# Patient Record
Sex: MALE | Race: WHITE | NOT HISPANIC OR LATINO | Employment: UNEMPLOYED | URBAN - METROPOLITAN AREA
[De-identification: names, ages, dates, MRNs, and addresses within clinical notes are randomized per-mention and may not be internally consistent; named-entity substitution may affect disease eponyms.]

---

## 2024-10-09 ENCOUNTER — HOSPITAL ENCOUNTER (EMERGENCY)
Facility: HOSPITAL | Age: 24
Discharge: LEFT BEFORE DISCHARGE | End: 2024-10-09
Attending: EMERGENCY MEDICINE

## 2024-10-09 VITALS
HEIGHT: 66 IN | BODY MASS INDEX: 28.61 KG/M2 | WEIGHT: 178 LBS | RESPIRATION RATE: 17 BRPM | OXYGEN SATURATION: 96 % | DIASTOLIC BLOOD PRESSURE: 87 MMHG | SYSTOLIC BLOOD PRESSURE: 138 MMHG | TEMPERATURE: 98 F | HEART RATE: 82 BPM

## 2024-10-09 DIAGNOSIS — M62.82 NON-TRAUMATIC RHABDOMYOLYSIS: ICD-10-CM

## 2024-10-09 DIAGNOSIS — R00.0 TACHYCARDIA: Primary | ICD-10-CM

## 2024-10-09 LAB
ALBUMIN SERPL BCP-MCNC: 4 G/DL (ref 3.5–5.2)
ALP SERPL-CCNC: 69 U/L (ref 55–135)
ALT SERPL W/O P-5'-P-CCNC: 30 U/L (ref 10–44)
ANION GAP SERPL CALC-SCNC: 12 MMOL/L (ref 8–16)
AST SERPL-CCNC: 41 U/L (ref 10–40)
BASOPHILS # BLD AUTO: 0.04 K/UL (ref 0–0.2)
BASOPHILS NFR BLD: 0.3 % (ref 0–1.9)
BILIRUB SERPL-MCNC: 0.3 MG/DL (ref 0.1–1)
BNP SERPL-MCNC: <10 PG/ML (ref 0–99)
BUN SERPL-MCNC: 19 MG/DL (ref 6–20)
CALCIUM SERPL-MCNC: 9.5 MG/DL (ref 8.7–10.5)
CHLORIDE SERPL-SCNC: 105 MMOL/L (ref 95–110)
CK SERPL-CCNC: 949 U/L (ref 20–200)
CO2 SERPL-SCNC: 24 MMOL/L (ref 23–29)
CREAT SERPL-MCNC: 1.2 MG/DL (ref 0.5–1.4)
DIFFERENTIAL METHOD BLD: ABNORMAL
EOSINOPHIL # BLD AUTO: 0.3 K/UL (ref 0–0.5)
EOSINOPHIL NFR BLD: 2.2 % (ref 0–8)
ERYTHROCYTE [DISTWIDTH] IN BLOOD BY AUTOMATED COUNT: 11.8 % (ref 11.5–14.5)
EST. GFR  (NO RACE VARIABLE): >60 ML/MIN/1.73 M^2
ETHANOL SERPL-MCNC: <10 MG/DL
GLUCOSE SERPL-MCNC: 109 MG/DL (ref 70–110)
HCT VFR BLD AUTO: 43.5 % (ref 40–54)
HCV AB SERPL QL IA: NEGATIVE
HGB BLD-MCNC: 14.9 G/DL (ref 14–18)
HIV 1+2 AB+HIV1 P24 AG SERPL QL IA: NEGATIVE
IMM GRANULOCYTES # BLD AUTO: 0.03 K/UL (ref 0–0.04)
IMM GRANULOCYTES NFR BLD AUTO: 0.3 % (ref 0–0.5)
LYMPHOCYTES # BLD AUTO: 1.7 K/UL (ref 1–4.8)
LYMPHOCYTES NFR BLD: 14.4 % (ref 18–48)
MCH RBC QN AUTO: 30.3 PG (ref 27–31)
MCHC RBC AUTO-ENTMCNC: 34.3 G/DL (ref 32–36)
MCV RBC AUTO: 88 FL (ref 82–98)
MONOCYTES # BLD AUTO: 0.9 K/UL (ref 0.3–1)
MONOCYTES NFR BLD: 7.3 % (ref 4–15)
NEUTROPHILS # BLD AUTO: 8.9 K/UL (ref 1.8–7.7)
NEUTROPHILS NFR BLD: 75.5 % (ref 38–73)
NRBC BLD-RTO: 0 /100 WBC
PLATELET # BLD AUTO: 313 K/UL (ref 150–450)
PMV BLD AUTO: 9 FL (ref 9.2–12.9)
POTASSIUM SERPL-SCNC: 3.7 MMOL/L (ref 3.5–5.1)
PROT SERPL-MCNC: 7.4 G/DL (ref 6–8.4)
RBC # BLD AUTO: 4.92 M/UL (ref 4.6–6.2)
SODIUM SERPL-SCNC: 141 MMOL/L (ref 136–145)
TROPONIN I SERPL DL<=0.01 NG/ML-MCNC: <0.006 NG/ML (ref 0–0.03)
WBC # BLD AUTO: 11.73 K/UL (ref 3.9–12.7)

## 2024-10-09 PROCEDURE — 84484 ASSAY OF TROPONIN QUANT: CPT | Performed by: EMERGENCY MEDICINE

## 2024-10-09 PROCEDURE — 85025 COMPLETE CBC W/AUTO DIFF WBC: CPT | Performed by: EMERGENCY MEDICINE

## 2024-10-09 PROCEDURE — 82077 ASSAY SPEC XCP UR&BREATH IA: CPT | Performed by: EMERGENCY MEDICINE

## 2024-10-09 PROCEDURE — 36415 COLL VENOUS BLD VENIPUNCTURE: CPT | Performed by: EMERGENCY MEDICINE

## 2024-10-09 PROCEDURE — 80053 COMPREHEN METABOLIC PANEL: CPT | Performed by: EMERGENCY MEDICINE

## 2024-10-09 PROCEDURE — 87389 HIV-1 AG W/HIV-1&-2 AB AG IA: CPT | Performed by: EMERGENCY MEDICINE

## 2024-10-09 PROCEDURE — 63600175 PHARM REV CODE 636 W HCPCS: Performed by: EMERGENCY MEDICINE

## 2024-10-09 PROCEDURE — 86803 HEPATITIS C AB TEST: CPT | Performed by: EMERGENCY MEDICINE

## 2024-10-09 PROCEDURE — 94760 N-INVAS EAR/PLS OXIMETRY 1: CPT

## 2024-10-09 PROCEDURE — 82550 ASSAY OF CK (CPK): CPT | Performed by: EMERGENCY MEDICINE

## 2024-10-09 PROCEDURE — 83880 ASSAY OF NATRIURETIC PEPTIDE: CPT | Performed by: EMERGENCY MEDICINE

## 2024-10-09 PROCEDURE — 99284 EMERGENCY DEPT VISIT MOD MDM: CPT

## 2024-10-09 RX ORDER — ONDANSETRON HYDROCHLORIDE 2 MG/ML
8 INJECTION, SOLUTION INTRAVENOUS
Status: DISCONTINUED | OUTPATIENT
Start: 2024-10-09 | End: 2024-10-10 | Stop reason: HOSPADM

## 2024-10-09 RX ORDER — PANTOPRAZOLE SODIUM 40 MG/10ML
40 INJECTION, POWDER, LYOPHILIZED, FOR SOLUTION INTRAVENOUS
Status: DISCONTINUED | OUTPATIENT
Start: 2024-10-09 | End: 2024-10-10 | Stop reason: HOSPADM

## 2024-10-09 RX ADMIN — SODIUM CHLORIDE, POTASSIUM CHLORIDE, SODIUM LACTATE AND CALCIUM CHLORIDE 1000 ML: 600; 310; 30; 20 INJECTION, SOLUTION INTRAVENOUS at 09:10

## 2024-10-09 NOTE — LETTER
Patient: Fortino Ng  YOB: 2000  Date: 10/9/2024 Time: 9:45 PM  Location: [facility]    Leaving the Hospital Against Medical Advice    Chart #:[id number]    This will certify that I, the undersigned,    ______________________________________________________________________    A patient in the above named medical center, having requested discharge and removal from the medical center against the advice of my attending physician(s), hereby release [facility], its physicians, officers and employees, severally and individually, from any and all liability of any nature whatsoever for any injury or harm or complication of any kind that may result directly or indirectly, by reason of my terminating my stay as a patient at [facility] and my departure from [facility], and hereby waive any and all rights of action I may now have or later acquire as a result of my voluntary departure from [facility] and the termination of my stay as a patient therein.    This release is made with the full knowledge of the danger that may result from the action which I am taking.      Date:_______________________                         ___________________________                                                                                    Patient/Legal Representative    Witness:        ____________________________                          ___________________________  Nurse                                                                        Physician     done
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
## 2024-10-13 ENCOUNTER — HOSPITAL ENCOUNTER (EMERGENCY)
Facility: HOSPITAL | Age: 24
Discharge: HOME | End: 2024-10-14
Attending: EMERGENCY MEDICINE

## 2024-10-13 DIAGNOSIS — F10.920 ALCOHOLIC INTOXICATION WITHOUT COMPLICATION: Primary | ICD-10-CM

## 2024-10-13 PROCEDURE — 99281 EMR DPT VST MAYX REQ PHY/QHP: CPT

## 2024-10-14 VITALS
BODY MASS INDEX: 26.52 KG/M2 | WEIGHT: 165 LBS | RESPIRATION RATE: 16 BRPM | DIASTOLIC BLOOD PRESSURE: 55 MMHG | HEIGHT: 66 IN | HEART RATE: 86 BPM | TEMPERATURE: 98 F | OXYGEN SATURATION: 98 % | SYSTOLIC BLOOD PRESSURE: 116 MMHG

## 2024-10-14 NOTE — ED PROVIDER NOTES
"Encounter Date: 10/13/2024       History     Chief Complaint   Patient presents with    Alcohol Intoxication     Walked to the hospital asking for help to stop drinking. Pt states he is homeless and steals the alcohol he drinks. Denies HI/SI.      24-year-old male with history of chronic alcohol abuse, presents to the ED with chief complaint requesting alcohol rehab.  He says he drinks heavily every day including today.  He was obviously intoxicated.  He says he wants to stop.  He has no other complaints.  He denies SI/HI.    The history is provided by the patient.     Review of patient's allergies indicates:   Allergen Reactions    Augmentin [amoxicillin-pot clavulanate]     Tramadol      No past medical history on file.  No past surgical history on file.  No family history on file.  Social History     Substance Use Topics    Alcohol use: Yes     Comment: Reportedly drinks daily, any alcohol he can "steal"     Review of Systems   Psychiatric/Behavioral:  Negative for suicidal ideas.    All other systems reviewed and are negative.      Physical Exam     Initial Vitals   BP Pulse Resp Temp SpO2   10/13/24 2222 10/13/24 2221 10/13/24 2221 10/13/24 2223 10/13/24 2221   (!) 145/63 99 16 98.2 °F (36.8 °C) 95 %      MAP       --                Physical Exam    Nursing note and vitals reviewed.  Constitutional: He appears well-developed and well-nourished. He is not diaphoretic. No distress.   HENT:   Head: Normocephalic and atraumatic.   Eyes: Conjunctivae are normal.   Neck: Neck supple.   Normal range of motion.  Cardiovascular:  Normal rate.           Pulmonary/Chest: No respiratory distress.   Abdominal: He exhibits no distension.   Musculoskeletal:         General: No edema.      Cervical back: Normal range of motion and neck supple.     Neurological:   Somnolent, arouses to voice, slurred speech, oriented to person and place   Skin: Skin is warm and dry. No rash noted. No erythema.         ED Course "   Procedures  Labs Reviewed - No data to display       Imaging Results    None          Medications - No data to display  Medical Decision Making  The patient is intoxicated.  There do not appear to be any other emergent issues or debilitating psychiatric issues.  Patient will be observed in the ED until he denzel and then re-evaluated, likely discharge with outpatient drug rehab resources.    Pt is now awake, appropriate, ambulating with a steady gait. Clinically sober, appropriate for discharge.                                       Clinical Impression:  Final diagnoses:  [F10.920] Alcoholic intoxication without complication (Primary)          ED Disposition Condition    Discharge Stable          ED Prescriptions    None       Follow-up Information       Follow up With Specialties Details Why Contact Info    Drug rehab of choice                 Nehemiah Winn MD  10/14/24 9020

## 2024-12-17 ENCOUNTER — HOSPITAL ENCOUNTER (EMERGENCY)
Facility: HOSPITAL | Age: 24
Discharge: HOME | End: 2024-12-17
Attending: EMERGENCY MEDICINE

## 2024-12-17 VITALS
DIASTOLIC BLOOD PRESSURE: 90 MMHG | TEMPERATURE: 98 F | WEIGHT: 165 LBS | BODY MASS INDEX: 26.52 KG/M2 | SYSTOLIC BLOOD PRESSURE: 136 MMHG | HEIGHT: 66 IN | RESPIRATION RATE: 16 BRPM | HEART RATE: 60 BPM | OXYGEN SATURATION: 100 %

## 2024-12-17 DIAGNOSIS — F15.10 METHAMPHETAMINE ABUSE: Primary | ICD-10-CM

## 2024-12-17 DIAGNOSIS — Z71.1 CONCERN ABOUT STD IN MALE WITHOUT DIAGNOSIS: ICD-10-CM

## 2024-12-17 PROCEDURE — 99283 EMERGENCY DEPT VISIT LOW MDM: CPT

## 2024-12-17 NOTE — ED PROVIDER NOTES
"Encounter Date: 12/17/2024    SCRIBE #1 NOTE: I, Andres Zheng, am scribing for, and in the presence of,  Fabricio Dickinson MD. I have scribed the entire note.       History     Chief Complaint   Patient presents with    Penile Discharge     Pt presents to ed with c/o having drainage that he states is sometimes white and sometimes green and lands on his left testicle. States he is not sure where it comes from and it has been going on since April 24 y.o.male presented to the ED for left testicle drainage that has been going on since April 2024. He stated he has no issue with discharge from the penis, but he does see have green drainage that is landing on the left testicle. He also doesn't know where the discharge is actually coming from, but he stated it doesn't have any other pains in the penis. He is an active meth user and the last time he used was this morning.       The history is provided by the patient. No  was used.     Review of patient's allergies indicates:   Allergen Reactions    Augmentin [amoxicillin-pot clavulanate]     Tramadol      History reviewed. No pertinent past medical history.  History reviewed. No pertinent surgical history.  No family history on file.  Social History     Tobacco Use    Smoking status: Every Day     Types: Cigarettes    Smokeless tobacco: Never   Substance Use Topics    Alcohol use: Yes     Comment: Reportedly drinks daily, any alcohol he can "steal"    Drug use: Yes     Types: Marijuana, Methamphetamines     Review of Systems   Genitourinary:  Negative for difficulty urinating, penile discharge, penile pain, penile swelling, scrotal swelling and testicular pain.        PT is complaining of green discharge from left / There is none.    All other systems reviewed and are negative.      Physical Exam     Initial Vitals [12/17/24 1015]   BP Pulse Resp Temp SpO2   (!) 136/90 60 16 97.6 °F (36.4 °C) 100 %      MAP       --         Physical Exam    Nursing " note and vitals reviewed.  HENT:   Head: Normocephalic and atraumatic. Mouth/Throat: Oropharynx is clear and moist.   Eyes: Pupils are equal, round, and reactive to light.   Neck: Neck supple.   Normal range of motion.  Cardiovascular:  Normal rate and regular rhythm.           Pulmonary/Chest: Effort normal and breath sounds normal.   Abdominal: Abdomen is soft. He exhibits no distension.   Genitourinary: No discharge found.    Genitourinary Comments: PT doesn't have any discharge from testicle present     Musculoskeletal:         General: Normal range of motion.      Cervical back: Normal range of motion and neck supple.     Neurological: He is alert.   Skin: Skin is warm. Capillary refill takes less than 2 seconds.   Psychiatric: He has a normal mood and affect.         ED Course   Procedures  Labs Reviewed - No data to display       Imaging Results    None          Medications - No data to display  Medical Decision Making            Attending Attestation:           Physician Attestation for Scribe:  Physician Attestation Statement for Scribe #1: I, Fabricio Dickinson MD, reviewed documentation, as scribed by Andres Zheng in my presence, and it is both accurate and complete.             ED Course as of 12/17/24 1153   Tue Dec 17, 2024   1014 Medical decision-making:  Differential diagnosis includes alcohol abuse, drug abuse, methamphetamine abuse, STD, gonorrhea, chlamydia, psychosis.  No labs or imaging were performed on this patient. [BB]      ED Course User Index  [BB] Fabricio Dickinson MD                           Clinical Impression:  Final diagnoses:  [F15.10] Methamphetamine abuse (Primary)  [Z71.1] Concern about STD in male without diagnosis          ED Disposition Condition    Discharge Stable          ED Prescriptions    None       Follow-up Information       Follow up With Specialties Details Why Contact Info    Jam Lay MD Family [facility]   [address]  [place]  [address]  [phone]               Fabricio Dickinson MD  12/17/24 1546

## 2024-12-17 NOTE — DISCHARGE INSTRUCTIONS
Follow up in clinic with primary care provider for STD testing.  Stop drinking alcohol.  Stop using methamphetamines.  Return to emergency department for any worsening or further problems.